# Patient Record
Sex: MALE | RURAL
[De-identification: names, ages, dates, MRNs, and addresses within clinical notes are randomized per-mention and may not be internally consistent; named-entity substitution may affect disease eponyms.]

---

## 2020-08-03 ENCOUNTER — OFFICE VISIT (OUTPATIENT)
Dept: PRIMARY CARE CLINIC | Age: 75
End: 2020-08-03

## 2020-08-03 DIAGNOSIS — Z20.828 EXPOSURE TO SARS-ASSOCIATED CORONAVIRUS: Primary | ICD-10-CM

## 2020-08-05 LAB — SARS-COV-2, NAA: NOT DETECTED

## 2020-12-16 ENCOUNTER — OFFICE VISIT (OUTPATIENT)
Dept: PRIMARY CARE CLINIC | Age: 75
End: 2020-12-16

## 2020-12-16 DIAGNOSIS — Z20.822 ENCOUNTER FOR LABORATORY TESTING FOR COVID-19 VIRUS: Primary | ICD-10-CM

## 2020-12-16 NOTE — PROGRESS NOTES
Pt presents to the flu clinic today requesting a covid test. Pt denies symptoms but has had a possible exposure. He declined seeing doctor today.  CAM[place]

## 2020-12-18 LAB — SARS-COV-2, NAA: NOT DETECTED

## 2023-08-18 ENCOUNTER — OFFICE VISIT (OUTPATIENT)
Age: 78
End: 2023-08-18

## 2023-08-18 VITALS
HEART RATE: 78 BPM | SYSTOLIC BLOOD PRESSURE: 121 MMHG | BODY MASS INDEX: 25.09 KG/M2 | WEIGHT: 179.2 LBS | DIASTOLIC BLOOD PRESSURE: 76 MMHG | HEIGHT: 71 IN

## 2023-08-18 DIAGNOSIS — R79.9 ABNORMAL FINDING OF BLOOD CHEMISTRY, UNSPECIFIED: ICD-10-CM

## 2023-08-18 DIAGNOSIS — M85.80 OSTEOPENIA, UNSPECIFIED LOCATION: Primary | ICD-10-CM

## 2023-08-18 RX ORDER — ATORVASTATIN CALCIUM 10 MG/1
TABLET, FILM COATED ORAL
COMMUNITY
Start: 2023-06-29

## 2023-08-18 RX ORDER — SILDENAFIL 100 MG/1
TABLET, FILM COATED ORAL
COMMUNITY
Start: 2022-09-08

## 2023-08-18 RX ORDER — MECOBALAMIN 5000 MCG
TABLET,DISINTEGRATING ORAL
COMMUNITY

## 2023-08-18 RX ORDER — ANTACID TABLETS 648 MG/1
1 TABLET, CHEWABLE ORAL DAILY
Qty: 30 TABLET | Refills: 3 | Status: SHIPPED | OUTPATIENT
Start: 2023-08-18
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
## 2023-08-18 NOTE — PATIENT INSTRUCTIONS
Vitamin D 1000 units daily   Calcium 600mg daily (see calcium containing foods)    Osteoporosis recommend to exercise for at least 30 minutes three times per week. A variety of exercise types, including resistance training, jogging, jumping, and walking, is effective. 24 HOUR URINE COLLECTION  As part of your evaluation, we would like for you to collect a 24 hour sample of urine. This means that you will collect all of your urine into a collection jug for 24 hours. - The first step is to visit the laboratory to receive a collection jug.   - Then select a day to start the urine collection. Keep in mind when selecting a day that you will need to return to the laboratory the following day to submit your urine sample. - On the morning of the day you start collecting urine, the very first urine void should go into the toilet however everything else collected afterward should be collected into the jug, including any urine overnight, AND the first urine the following morning.   - After this you can stop collecting and bring the sample to the laboratory. We will discuss the results with you along with any other labs you have completed. If you have other blood work to perform, you can complete that when you submit the urine sample to the lab. Just in case your lab tests may require it, return to the laboratory in the morning fasting. Food Sources for Vitamin D:  Few foods are naturally rich in vitamin D3. The best sources are the flesh of fatty fish and fish liver oils. Smaller amounts are found in egg yolks, cheese, and beef liver. Certain mushrooms contain some vitamin D2; in addition some commercially sold mushrooms contain higher amounts of D2 due to intentionally being exposed to high amounts of ultraviolet light. Many foods and supplements are fortified with vitamin D like dairy products and cereals.    Cod liver oil   [place]   Swordfish   Tuna fish   Orange juice fortified with vitamin

## 2023-09-06 ENCOUNTER — TELEPHONE (OUTPATIENT)
Age: 78
End: 2023-09-06

## 2023-09-06 NOTE — TELEPHONE ENCOUNTER
Patient left a voice message stating that he had his primary doctor faxed over  his most recent labs and he would like for Dr. Teri Jovel to review them and let him know if there will be any changes. ( Lab report was received and put in folder for Dr. Teri Jovel)       He stated that at his NP visit we did not have them. Please advise if there will be any changes .

## 2023-09-12 NOTE — TELEPHONE ENCOUNTER
Patient left another voice message requesting to speak with Dr. Maris Agudelo in regards to the labs that were sent over by Dr. Stefanie Dunlap. He stated that he would like to know will anything be changed based upon the new labs that were received. He stated that we did not have him at his appt. Please call.

## 2023-09-14 NOTE — TELEPHONE ENCOUNTER
Left mr Staley Fell a voice message that I will call him on Friday 09/15/23 to discuss his lab results    Contacted mr Rebeka Baker but no response at that time

## 2023-09-20 LAB
25(OH)D3+25(OH)D2 SERPL-MCNC: 62.2 NG/ML (ref 30–100)
ALBUMIN SERPL-MCNC: 4.6 G/DL (ref 3.8–4.8)
ALBUMIN/GLOB SERPL: 2.3 {RATIO} (ref 1.2–2.2)
ALP SERPL-CCNC: 81 IU/L (ref 44–121)
ALT SERPL-CCNC: 15 IU/L (ref 0–44)
AST SERPL-CCNC: 15 IU/L (ref 0–40)
BILIRUB SERPL-MCNC: 0.5 MG/DL (ref 0–1.2)
BUN SERPL-MCNC: 17 MG/DL (ref 8–27)
BUN/CREAT SERPL: 17 (ref 10–24)
CALCIUM SERPL-MCNC: 9.4 MG/DL (ref 8.6–10.2)
CHLORIDE SERPL-SCNC: 101 MMOL/L (ref 96–106)
CO2 SERPL-SCNC: 23 MMOL/L (ref 20–29)
CREAT 24H UR-MRATE: 1718 MG/24 HR (ref 1000–2000)
CREAT SERPL-MCNC: 1.03 MG/DL (ref 0.76–1.27)
CREAT UR-MCNC: 114.5 MG/DL
EGFRCR SERPLBLD CKD-EPI 2021: 74 ML/MIN/1.73
GLOBULIN SER CALC-MCNC: 2 G/DL (ref 1.5–4.5)
GLUCOSE SERPL-MCNC: 98 MG/DL (ref 70–99)
MAGNESIUM SERPL-MCNC: 2 MG/DL (ref 1.6–2.3)
PHOSPHATE SERPL-MCNC: 4.3 MG/DL (ref 2.8–4.1)
POTASSIUM SERPL-SCNC: 4.6 MMOL/L (ref 3.5–5.2)
PROT SERPL-MCNC: 6.6 G/DL (ref 6–8.5)
PTH-INTACT SERPL-MCNC: 15 PG/ML (ref 15–65)
SODIUM SERPL-SCNC: 141 MMOL/L (ref 134–144)

## 2023-09-21 LAB
CALCIUM 24H UR-MCNC: 23 MG/DL
CALCIUM 24H UR-MRATE: 345 MG/24 HR (ref 0–320)

## 2023-10-09 NOTE — TELEPHONE ENCOUNTER
Mr Chetan Moses to keep his appointment on 10/19/2023 and discuss labs and bone agent treatment at the time of the visit

## 2023-10-13 NOTE — TELEPHONE ENCOUNTER
Patient notified of the message noted per Dr. Tawanda Dubois and voiced understanding. Patient offered no more questions at this time.

## 2023-10-19 ENCOUNTER — OFFICE VISIT (OUTPATIENT)
Age: 78
End: 2023-10-19
Payer: MEDICARE

## 2023-10-19 VITALS
HEART RATE: 73 BPM | DIASTOLIC BLOOD PRESSURE: 75 MMHG | WEIGHT: 178.2 LBS | HEIGHT: 71 IN | SYSTOLIC BLOOD PRESSURE: 128 MMHG | BODY MASS INDEX: 24.95 KG/M2

## 2023-10-19 DIAGNOSIS — M85.859 OSTEOPENIA OF HIP, UNSPECIFIED LATERALITY: Primary | ICD-10-CM

## 2023-10-19 DIAGNOSIS — R82.994 HYPERCALCIURIA: ICD-10-CM

## 2023-10-19 PROCEDURE — G8427 DOCREV CUR MEDS BY ELIG CLIN: HCPCS | Performed by: GENERAL ACUTE CARE HOSPITAL

## 2023-10-19 PROCEDURE — 99214 OFFICE O/P EST MOD 30 MIN: CPT | Performed by: GENERAL ACUTE CARE HOSPITAL

## 2023-10-19 PROCEDURE — G8420 CALC BMI NORM PARAMETERS: HCPCS | Performed by: GENERAL ACUTE CARE HOSPITAL

## 2023-10-19 PROCEDURE — G8484 FLU IMMUNIZE NO ADMIN: HCPCS | Performed by: GENERAL ACUTE CARE HOSPITAL

## 2023-10-19 PROCEDURE — 1036F TOBACCO NON-USER: CPT | Performed by: GENERAL ACUTE CARE HOSPITAL

## 2023-10-19 PROCEDURE — 1123F ACP DISCUSS/DSCN MKR DOCD: CPT | Performed by: GENERAL ACUTE CARE HOSPITAL

## 2023-10-19 NOTE — PATIENT INSTRUCTIONS
Vitamin D 1000 units daily   Calcium 600 mg daily (see calcium containing foods)    Food Sources for Vitamin D:  Few foods are naturally rich in vitamin D3. The best sources are the flesh of fatty fish and fish liver oils. Smaller amounts are found in egg yolks, cheese, and beef liver. Certain mushrooms contain some vitamin D2; in addition some commercially sold mushrooms contain higher amounts of D2 due to intentionally being exposed to high amounts of ultraviolet light. Many foods and supplements are fortified with vitamin D like dairy products and cereals. Cod liver oil   [place]   Swordfish   Tuna fish   Orange juice fortified with vitamin D   Dairy and plant milks fortified with vitamin D   Sardines   Beef liver   Egg yolk   Fortified cereals       Osteopenia causes bones to become thin and weak. It is much more common in women than in men. Your chances of getting this disease depend on several things. These factors include the thickness of your bones (bone density), as well as health, diet, and physical activity. This disease may be very advanced before you know you have it. Sometimes the first sign is a broken bone in the hip, spine, or wrist. Or you may have sudden pain in your middle or lower back. Follow-up care is a key part of your treatment and safety. Be sure to make and go to all appointments, and call your doctor if you are having problems. It's also a good idea to know your test results and keep a list of the medicines you take. How can you care for yourself at home? Get enough calcium and vitamin D. The recommended dietary allowances (RDAs) for adults younger than age 46 are 1,000 mg of calcium and 600 IU of vitamin D each day. Women ages 46 to 79 need 1,200 mg of calcium and 600 IU of vitamin D each day. Men ages 46 to 79 need 1,000 mg of calcium and 600 IU of vitamin D each day. Adults 71 and older need 1,200 mg of calcium and 800 IU of vitamin D each day.  It's not clear if people who

## 2023-10-19 NOTE — PROGRESS NOTES
bone health at this time, we discussed benefits and possible risks/side effects he indicates understanding and does not want to start the medication at this time. Mr Aurelia Kothari feels that Anika Alonso made it to almost 78years old in good health\" so wants to delay any taking any new medications. Advised to continue with recommendations on Calcium and Vitamin D intake and falls precautions. To inform our clinic if any updates, follow up in 1 year. Plan:  Calcium: continue calcium 600mg supplem + dietary calcium, goal of 1000mg total daily intake (see calcium containing foods)  Vitamin D: Cont on 1000 units of Vitamin D3 Once daily  Anticipated DXA [id number]    Counseled on reducing risks of falls. He is very active goes for skiing and dirt bike riding. Advised caution when practicing high impact sports. Patient is advised to call if they have any questions or concerns,    Please note that this dictation was completed with [employer], the computer voice recognition software. Quite often unanticipated grammatical, syntax, homophones, and other interpretive errors are inadvertently transcribed by the computer software. Efforts were made to correct these errors in proofreading. Please excuse any errors that have escaped final proofreading. Thank you. Sim Mancilla MD   [facility]     Patient Instructions   Vitamin D 1000 units daily   Calcium 600 mg daily (see calcium containing foods)    Food Sources for Vitamin D:  Few foods are naturally rich in vitamin D3. The best sources are the flesh of fatty fish and fish liver oils. Smaller amounts are found in egg yolks, cheese, and beef liver. Certain mushrooms contain some vitamin D2; in addition some commercially sold mushrooms contain higher amounts of D2 due to intentionally being exposed to high amounts of ultraviolet light. Many foods and supplements are fortified with vitamin D like dairy products and cereals.    [address] liver

## 2023-10-21 PROBLEM — M85.859 OSTEOPENIA OF HIP: Status: ACTIVE | Noted: 2023-10-21

## 2023-10-21 PROBLEM — R82.994 HYPERCALCIURIA: Status: ACTIVE | Noted: 2023-10-21

## 2025-02-13 ENCOUNTER — TRANSCRIBE ORDERS (OUTPATIENT)
Facility: HOSPITAL | Age: 80
End: 2025-02-13

## 2025-02-13 DIAGNOSIS — M81.0 AGE-RELATED OSTEOPOROSIS WITHOUT CURRENT PATHOLOGICAL FRACTURE: Primary | ICD-10-CM

## 2025-02-27 ENCOUNTER — HOSPITAL ENCOUNTER (OUTPATIENT)
Facility: HOSPITAL | Age: 80
Discharge: HOME | End: 2025-02-27
Payer: MEDICARE

## 2025-02-27 VITALS — HEIGHT: 71 IN | BODY MASS INDEX: 24.5 KG/M2 | WEIGHT: 175 LBS

## 2025-02-27 DIAGNOSIS — M81.0 AGE-RELATED OSTEOPOROSIS WITHOUT CURRENT PATHOLOGICAL FRACTURE: ICD-10-CM

## 2025-02-27 PROCEDURE — 77080 DXA BONE DENSITY AXIAL: CPT
